# Patient Record
(demographics unavailable — no encounter records)

---

## 2025-03-17 NOTE — REVIEW OF SYSTEMS
[Joint Pain] : joint pain [Joint Stiffness] : joint stiffness [Muscle Pain] : muscle pain [Insomnia] : insomnia [Anxiety] : anxiety [Depression] : depression [Negative] : Heme/Lymph [Joint Swelling] : no joint swelling [Muscle Weakness] : no muscle weakness [Back Pain] : no back pain [Suicidal] : not suicidal [FreeTextEntry9] : +Ankle/ Foot pain  [de-identified] : +Meds are helpful

## 2025-03-17 NOTE — HEALTH RISK ASSESSMENT
[Yes] : Yes [2 - 4 times a month (2 pts)] : 2-4 times a month (2 points) [1 or 2 (0 pts)] : 1 or 2 (0 points) [Never (0 pts)] : Never (0 points) [No] : In the past 12 months have you used drugs other than those required for medical reasons? No [No falls in past year] : Patient reported no falls in the past year [0] : 2) Feeling down, depressed, or hopeless: Not at all (0) [Current] : Current [de-identified] : none [de-identified] : podiatry [Audit-CScore] : 1 [de-identified] : walking [de-identified] : healthy [[id number]] : 0

## 2025-03-17 NOTE — PHYSICAL EXAM
[No Acute Distress] : no acute distress [No JVD] : no jugular venous distention [Normal] : normal rate, regular rhythm, normal S1 and S2 and no murmur heard [No Edema] : there was no peripheral edema [Soft] : abdomen soft [Normal Anterior Cervical Nodes] : no anterior cervical lymphadenopathy [No CVA Tenderness] : no CVA  tenderness [No Spinal Tenderness] : no spinal tenderness [No Rash] : no rash [Coordination Grossly Intact] : coordination grossly intact [No Focal Deficits] : no focal deficits [Normal Gait] : normal gait [Deep Tendon Reflexes (DTR)] : deep tendon reflexes were 2+ and symmetric [Speech Grossly Normal] : speech grossly normal [Memory Grossly Normal] : memory grossly normal [Normal Affect] : the affect was normal [Alert and Oriented x3] : oriented to person, place, and time [Normal Mood] : the mood was normal [Normal Insight/Judgement] : insight and judgment were intact [Kyphosis] : no kyphosis [Scoliosis] : no scoliosis [de-identified] : +Points to top of right foot points pain  moves to ankle then leg

## 2025-03-17 NOTE — HISTORY OF PRESENT ILLNESS
[FreeTextEntry1] : Medical Marijuana Eval [de-identified] : 55 yr old presents today, feels well overall. 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 Attends Counseling services on- line , has some depression/ Anxiety which medication is helping/ Amie Messer Therapist. Had Right Toe Surgery Several years ago, chronic pain Toe/ Foot/ ankle, Daily pain ,  varies in intensity . mainly when bearing weight and walking, will go to another Podiatrist/ Ortho get another opinion if surgery needed again? Does not want Surgery at this time.

## 2025-03-17 NOTE — ASSESSMENT
[FreeTextEntry1] : 55 yr old F/U  Depression with Anxiety- Controlled, F/U Counseling sessions, Celexa daily Stress reduction techniques  Right Foot Pain- F/U Podiatry/ Ortho Daily leg stretches  Will order Medical Marijuana for pain  Insomnia- Takes Hydroxyzine   F/U 3-4 months CPE

## 2025-03-17 NOTE — PHYSICAL EXAM
[No Acute Distress] : no acute distress [No JVD] : no jugular venous distention [Normal] : normal rate, regular rhythm, normal S1 and S2 and no murmur heard [No Edema] : there was no peripheral edema [Soft] : abdomen soft [Normal Anterior Cervical Nodes] : no anterior cervical lymphadenopathy [No CVA Tenderness] : no CVA  tenderness [No Spinal Tenderness] : no spinal tenderness [No Rash] : no rash [Coordination Grossly Intact] : coordination grossly intact [No Focal Deficits] : no focal deficits [Normal Gait] : normal gait [Deep Tendon Reflexes (DTR)] : deep tendon reflexes were 2+ and symmetric [Speech Grossly Normal] : speech grossly normal [Memory Grossly Normal] : memory grossly normal [Normal Affect] : the affect was normal [Alert and Oriented x3] : oriented to person, place, and time [Normal Mood] : the mood was normal [Normal Insight/Judgement] : insight and judgment were intact [Kyphosis] : no kyphosis [Scoliosis] : no scoliosis [de-identified] : +Points to top of right foot points pain  moves to ankle then leg

## 2025-03-17 NOTE — REVIEW OF SYSTEMS
[Joint Pain] : joint pain [Joint Stiffness] : joint stiffness [Muscle Pain] : muscle pain [Insomnia] : insomnia [Anxiety] : anxiety [Depression] : depression [Negative] : Heme/Lymph [Joint Swelling] : no joint swelling [Muscle Weakness] : no muscle weakness [Back Pain] : no back pain [Suicidal] : not suicidal [FreeTextEntry9] : +Ankle/ Foot pain  [de-identified] : +Meds are helpful

## 2025-03-17 NOTE — COUNSELING
[Behavioral health counseling provided] : Behavioral health counseling provided [Sleep ___ hours/day] : Sleep [unfilled] hours/day [Engage in a relaxing activity] : Engage in a relaxing activity [Plan in advance] : Plan in advance [None] : None [de-identified] : Healthy Lifestyle

## 2025-03-17 NOTE — HISTORY OF PRESENT ILLNESS
[FreeTextEntry1] : Medical Marijuana Eval [de-identified] : 55 yr old presents today, feels well overall. 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 Attends Counseling services on- line , has some depression/ Anxiety which medication is helping/ Amie Messer Therapist. Had Right Toe Surgery Several years ago, chronic pain Toe/ Foot/ ankle, Daily pain ,  varies in intensity . mainly when bearing weight and walking, will go to another Podiatrist/ Ortho get another opinion if surgery needed again? Does not want Surgery at this time.

## 2025-03-17 NOTE — COUNSELING
[Behavioral health counseling provided] : Behavioral health counseling provided [Sleep ___ hours/day] : Sleep [unfilled] hours/day [Engage in a relaxing activity] : Engage in a relaxing activity [Plan in advance] : Plan in advance [None] : None [de-identified] : Healthy Lifestyle

## 2025-03-17 NOTE — HEALTH RISK ASSESSMENT
[Yes] : Yes [2 - 4 times a month (2 pts)] : 2-4 times a month (2 points) [1 or 2 (0 pts)] : 1 or 2 (0 points) [Never (0 pts)] : Never (0 points) [No] : In the past 12 months have you used drugs other than those required for medical reasons? No [No falls in past year] : Patient reported no falls in the past year [0] : 2) Feeling down, depressed, or hopeless: Not at all (0) [Current] : Current [de-identified] : none [de-identified] : podiatry [Audit-CScore] : 1 [de-identified] : walking [de-identified] : healthy [[id number]] : 0

## 2025-07-26 NOTE — DISCUSSION/SUMMARY
[FreeTextEntry1] : 55 yr old F/u   Annual GYN Visit- Pap Test done via Thin Prep will send to lab  Normal Vaginal Exam- Some dryness noted Vaginal Area  Ordered Estradiol vaginal cream; Apply locally vaginal area 2-3 times weekly, very small amount

## 2025-07-26 NOTE — PHYSICAL EXAM
[MA] : MA [FreeTextEntry2] : Geraldine  [Awake] : awake [Alert] : alert [Acute Distress] : no acute distress [Mass] : no breast mass [Nipple Discharge] : no nipple discharge [Axillary LAD] : no axillary lymphadenopathy [Soft] : soft [Tender] : non tender [Oriented x3] : oriented to person, place, and time [Normal] : uterus [No Bleeding] : there was no active vaginal bleeding [Bluish Discoloration] : did not have a bluish discoloration [Mass ___ cm] : had no mass [Nabothian Cyst ___ cm] : had no nabothian cyst [Pap Obtained] : a Pap smear was performed [Motion Tenderness] : there was no cervical motion tenderness [Anteversion] : anteverted [Uterine Adnexae] : were not tender and not enlarged

## 2025-07-26 NOTE — HEALTH RISK ASSESSMENT
[Good] : ~his/her~  mood as  good [Monthly or less (1 pt)] : Monthly or less (1 point) [1 or 2 (0 pts)] : 1 or 2 (0 points) [Never (0 pts)] : Never (0 points) [Yes] : In the past 12 months have you used drugs other than those required for medical reasons? Yes [No falls in past year] : Patient reported no falls in the past year [0] : 2) Feeling down, depressed, or hopeless: Not at all (0) [Current] : Current [With Family] : lives with family [Retired] : retired [Fully functional (bathing, dressing, toileting, transferring, walking, feeding)] : Fully functional (bathing, dressing, toileting, transferring, walking, feeding) [Fully functional (using the telephone, shopping, preparing meals, housekeeping, doing laundry, using] : Fully functional and needs no help or supervision to perform IADLs (using the telephone, shopping, preparing meals, housekeeping, doing laundry, using transportation, managing medications and managing finances) [Smoke Detector] : smoke detector [Carbon Monoxide Detector] : carbon monoxide detector [Safety elements used in home] : safety elements used in home [Seat Belt] :  uses seat belt [Sunscreen] : uses sunscreen [None] : None [College] : College [Feels Safe at Home] : Feels safe at home [HIV test declined] : HIV test declined [Hepatitis C test declined] : Hepatitis C test declined [] :  [Sexually Active] : sexually active [Reports normal functional visual acuity (ie: able to read med bottle)] : Reports normal functional visual acuity [de-identified] : none [de-identified] : none [Audit-CScore] : 1 [de-identified] : marijuana [de-identified] : excercise [de-identified] : healthy [[id number]] : 0 [Change in mental status noted] : No change in mental status noted [Language] : denies difficulty with language [Handling Complex Tasks] : denies difficulty handling complex tasks [High Risk Behavior] : no high risk behavior [Reports changes in hearing] : Reports no changes in hearing [Reports changes in vision] : Reports no changes in vision [Reports changes in dental health] : Reports no changes in dental health [Travel to Developing Areas] : does not  travel to developing areas [TB Exposure] : is not being exposed to tuberculosis [Caregiver Concerns] : does not have caregiver concerns [MammogramDate] : 04/2024 [PapSmearComments] : today [BoneDensityComments] : never done [ColonoscopyComments] : 2 years ago

## 2025-07-26 NOTE — REVIEW OF SYSTEMS
[Insomnia] : insomnia [Anxiety] : anxiety [Negative] : Heme/Lymph [Suicidal] : not suicidal [Depression] : no depression [de-identified] : +medication helps

## 2025-07-26 NOTE — COUNSELING
[Behavioral health counseling provided] : Behavioral health counseling provided [Sleep ___ hours/day] : Sleep [unfilled] hours/day [Engage in a relaxing activity] : Engage in a relaxing activity [Plan in advance] : Plan in advance [None] : None [de-identified] : Continue healthy lifestyle

## 2025-07-26 NOTE — HISTORY OF PRESENT ILLNESS
[FreeTextEntry1] : CPE [de-identified] : 55 yr old presents today for above, feels well overall. Lives with Family,/  and 2 sons.  Retired from Job as Teacher. eats healthy most days, exercises regular  basis including recent Pilates group.

## 2025-07-26 NOTE — HISTORY OF PRESENT ILLNESS
[___ Year(s) Ago] : [unfilled] year(s) ago [Healthy Diet] : a healthy diet [Regular Exercise] : regular exercise [Weight Concerns] : no concerns with her weight [Postmenopausal] : is postmenopausal [Fever] : no fever [Nausea] : no nausea [Vomiting] : no vomiting [Diarrhea] : no diarrhea [Vaginal Bleeding] : no vaginal bleeding [Pelvic Pressure] : no pelvic pressure [Dysuria] : no dysuria [Burning] : no burning [Itching] : no itching [Mass] : no mass [Stinging] : no stinging [Lesion] : no lesion [Soreness] : soreness [Discharge] : no discharge [Vaginal Odor Foul] : vaginal odor not foul [Genital Wart] : no genital wart [Skin Ulcerations] : no skin ulceration(s) [Vaginal Pain Pressure] : no vaginal pain/pressure [Localized Pain] : no localized pain [Mass (___cm)] : no palpable mass [Diffused Pain] : no diffused pain [Nipple Discharge] : no nipple discharge [Skin Color Change] : no skin color change [Hot Flashes] : no hot flashes [Night Sweats] : no night sweats [Vaginal Itching] : no vaginal itching [Dyspareunia] : dyspareunia [Mood Changes] : no mood changes [Currently In Menopause] : currently in menopause [Sexually Active] : is sexually active [Monogamous] : is monogamous

## 2025-07-26 NOTE — HEALTH RISK ASSESSMENT
[Good] : ~his/her~  mood as  good [Monthly or less (1 pt)] : Monthly or less (1 point) [1 or 2 (0 pts)] : 1 or 2 (0 points) [Never (0 pts)] : Never (0 points) [Yes] : In the past 12 months have you used drugs other than those required for medical reasons? Yes [No falls in past year] : Patient reported no falls in the past year [0] : 2) Feeling down, depressed, or hopeless: Not at all (0) [Current] : Current [With Family] : lives with family [Retired] : retired [Fully functional (bathing, dressing, toileting, transferring, walking, feeding)] : Fully functional (bathing, dressing, toileting, transferring, walking, feeding) [Fully functional (using the telephone, shopping, preparing meals, housekeeping, doing laundry, using] : Fully functional and needs no help or supervision to perform IADLs (using the telephone, shopping, preparing meals, housekeeping, doing laundry, using transportation, managing medications and managing finances) [Smoke Detector] : smoke detector [Carbon Monoxide Detector] : carbon monoxide detector [Safety elements used in home] : safety elements used in home [Seat Belt] :  uses seat belt [Sunscreen] : uses sunscreen [None] : None [College] : College [Feels Safe at Home] : Feels safe at home [HIV test declined] : HIV test declined [Hepatitis C test declined] : Hepatitis C test declined [] :  [Sexually Active] : sexually active [Reports normal functional visual acuity (ie: able to read med bottle)] : Reports normal functional visual acuity [de-identified] : none [de-identified] : none [Audit-CScore] : 1 [de-identified] : marijuana [de-identified] : excercise [de-identified] : healthy [[id number]] : 0 [Change in mental status noted] : No change in mental status noted [Language] : denies difficulty with language [Handling Complex Tasks] : denies difficulty handling complex tasks [High Risk Behavior] : no high risk behavior [Reports changes in hearing] : Reports no changes in hearing [Reports changes in vision] : Reports no changes in vision [Reports changes in dental health] : Reports no changes in dental health [Travel to Developing Areas] : does not  travel to developing areas [TB Exposure] : is not being exposed to tuberculosis [Caregiver Concerns] : does not have caregiver concerns [MammogramDate] : 04/2024 [PapSmearComments] : today [BoneDensityComments] : never done [ColonoscopyComments] : 2 years ago

## 2025-07-26 NOTE — PHYSICAL EXAM
[No Acute Distress] : no acute distress [Well Nourished] : well nourished [Well Developed] : well developed [Normal Sclera/Conjunctiva] : normal sclera/conjunctiva [No Edema] : there was no peripheral edema [Normal Appearance] : normal in appearance [No Masses] : no palpable masses [No Nipple Discharge] : no nipple discharge [No Axillary Lymphadenopathy] : no axillary lymphadenopathy [Soft] : abdomen soft [Non Tender] : non-tender [Normal Anterior Cervical Nodes] : no anterior cervical lymphadenopathy [No CVA Tenderness] : no CVA  tenderness [No Spinal Tenderness] : no spinal tenderness [No Joint Swelling] : no joint swelling [Normal] : affect was normal and insight and judgment were intact [de-identified] : Sees breast Specialist yearly

## 2025-07-26 NOTE — COUNSELING
[Behavioral health counseling provided] : Behavioral health counseling provided [Sleep ___ hours/day] : Sleep [unfilled] hours/day [Engage in a relaxing activity] : Engage in a relaxing activity [Plan in advance] : Plan in advance [None] : None [de-identified] : Continue healthy lifestyle

## 2025-07-26 NOTE — HISTORY OF PRESENT ILLNESS
[FreeTextEntry1] : CPE [de-identified] : 55 yr old presents today for above, feels well overall. Lives with Family,/  and 2 sons.  Retired from Job as Teacher. eats healthy most days, exercises regular  basis including recent Pilates group.

## 2025-07-26 NOTE — ASSESSMENT
[Vaccines Reviewed] : Immunizations reviewed today. Please see immunization details in the vaccine log within the immunization flowsheet.  [FreeTextEntry1] : 55 yr old CPE  Overall very healthy  Depression with Anxiety- Continue Celexa daily Encouraged Mental Health counseling   Hypothyroidism- Check TSH today  Sleep problem- Takes Hydroxyzine PRN    Labs done in office by MA Screenings reviewed Sees Derm regular basis Eye/ Dental UTD ++Strongly recommend Shingrix vaccine++

## 2025-07-26 NOTE — REVIEW OF SYSTEMS
[Dizziness] : no dizziness [Fainting] : no fainting [Headache] : no headache [Sleep Disturbances] : sleep disturbances [Anxiety] : anxiety [Depression] : depression [Nl] : Hematologic/Lymphatic

## 2025-07-26 NOTE — PHYSICAL EXAM
[No Acute Distress] : no acute distress [Well Nourished] : well nourished [Well Developed] : well developed [Normal Sclera/Conjunctiva] : normal sclera/conjunctiva [No Edema] : there was no peripheral edema [Normal Appearance] : normal in appearance [No Masses] : no palpable masses [No Nipple Discharge] : no nipple discharge [No Axillary Lymphadenopathy] : no axillary lymphadenopathy [Soft] : abdomen soft [Non Tender] : non-tender [Normal Anterior Cervical Nodes] : no anterior cervical lymphadenopathy [No CVA Tenderness] : no CVA  tenderness [No Spinal Tenderness] : no spinal tenderness [No Joint Swelling] : no joint swelling [Normal] : affect was normal and insight and judgment were intact [de-identified] : Sees breast Specialist yearly

## 2025-07-26 NOTE — REVIEW OF SYSTEMS
[Insomnia] : insomnia [Anxiety] : anxiety [Negative] : Heme/Lymph [Suicidal] : not suicidal [Depression] : no depression [de-identified] : +medication helps